# Patient Record
Sex: MALE
[De-identification: names, ages, dates, MRNs, and addresses within clinical notes are randomized per-mention and may not be internally consistent; named-entity substitution may affect disease eponyms.]

---

## 2020-06-24 ENCOUNTER — NURSE TRIAGE (OUTPATIENT)
Dept: OTHER | Facility: CLINIC | Age: 40
End: 2020-06-24

## 2020-06-24 NOTE — TELEPHONE ENCOUNTER
Reason for Disposition   General information question, no triage required and triager able to answer question   MILD pain that comes and goes (cramps) lasts > 24 hours    Answer Assessment - Initial Assessment Questions  1. LOCATION: \"Where does it hurt? \"       Abdominal pain and back pain  2. RADIATION: \"Does the pain shoot anywhere else? \" (e.g., chest, back)    3. ONSET: \"When did the pain begin? \" (Minutes, hours or days ago)   Couple days ago  4. SUDDEN: \"Gradual or sudden onset?\"      5. PATTERN \"Does the pain come and go, or is it constant? \"     - If constant: \"Is it getting better, staying the same, or worsening? \"       (Note: Constant means the pain never goes away completely; most serious pain is constant and it progresses)      - If intermittent: \"How long does it last?\" \"Do you have pain now? \"      (Note: Intermittent means the pain goes away completely between bouts)  Pain is intermittent  6. SEVERITY: \"How bad is the pain? \"  (e.g., Scale 1-10; mild, moderate, or severe)     - MILD (1-3): doesn't interfere with normal activities, abdomen soft and not tender to touch      - MODERATE (4-7): interferes with normal activities or awakens from sleep, tender to touch      - SEVERE (8-10): excruciating pain, doubled over, unable to do any normal activities      Worse pain is middle back right now. 7. RECURRENT SYMPTOM: \"Have you ever had this type of abdominal pain before? \" If so, ask: \"When was the last time? \" and \"What happened that time?\"        8. CAUSE: \"What do you think is causing the abdominal pain? \"  unsure  9. RELIEVING/AGGRAVATING FACTORS: \"What makes it better or worse? \" (e.g., movement, antacids, bowel movement)    10. OTHER SYMPTOMS: \"Has there been any vomiting, diarrhea, constipation, or urine problems? \"  Diarrhea today, no more vomiting    Protocols used: INFORMATION ONLY CALL-ADULT-, ABDOMINAL PAIN - MALE-ADULT-[place]  Pt calling with intermittent abdominal pain and back pain for a couple of day. Has some diarrhea today, vomiting yesterday. No PCP. Provided 24/7 info, call back as needed.